# Patient Record
Sex: FEMALE | Race: ASIAN | NOT HISPANIC OR LATINO | Employment: UNEMPLOYED | URBAN - METROPOLITAN AREA
[De-identification: names, ages, dates, MRNs, and addresses within clinical notes are randomized per-mention and may not be internally consistent; named-entity substitution may affect disease eponyms.]

---

## 2023-04-11 ENCOUNTER — OFFICE VISIT (OUTPATIENT)
Dept: CARDIOLOGY | Facility: CLINIC | Age: 38
End: 2023-04-11
Payer: COMMERCIAL

## 2023-04-11 VITALS
HEIGHT: 70 IN | DIASTOLIC BLOOD PRESSURE: 89 MMHG | HEART RATE: 89 BPM | BODY MASS INDEX: 18.48 KG/M2 | WEIGHT: 129.1 LBS | SYSTOLIC BLOOD PRESSURE: 129 MMHG

## 2023-04-11 DIAGNOSIS — I47.10 SVT (SUPRAVENTRICULAR TACHYCARDIA) (H): Primary | ICD-10-CM

## 2023-04-11 PROCEDURE — 99215 OFFICE O/P EST HI 40 MIN: CPT | Performed by: INTERNAL MEDICINE

## 2023-04-11 RX ORDER — DILTIAZEM HYDROCHLORIDE 120 MG/1
1 CAPSULE, EXTENDED RELEASE ORAL DAILY
COMMUNITY
Start: 2023-02-22 | End: 2024-02-22

## 2023-04-11 RX ORDER — DILTIAZEM HYDROCHLORIDE 30 MG/1
30 TABLET, FILM COATED ORAL 4 TIMES DAILY PRN
Qty: 90 TABLET | Refills: 3 | Status: SHIPPED | OUTPATIENT
Start: 2023-04-11 | End: 2023-06-16

## 2023-04-11 NOTE — LETTER
4/11/2023    MD [facility] [place] [address]  [facility] [address]    RE: Lenora Morse       Dear Colleague,     I had the pleasure of seeing Lenora Morse in the [facility].    Electrophysiology Clinic Progress Note    Lenora Morse MRN# [id number]   YOB: 1985 Age: 38 year old     Primary cardiologist:          Assessment and Plan   Thank you for allowing me to participate in the care of this very delightful patient.  As you know, Lenora is a 38-year-old female previously healthy who had a viral illness a few years ago and soon thereafter noted palpitations of sudden onset.  She was subsequently evaluated by EP over at [facility].  EP study was subsequently done showing a focal atrial tachycardia near the CS os.  Ablation was done successfully.    Unfortunately, over the past several months has been having more episodes of sudden racing heartbeat with minimal activities for which she was able to capture on her Apple Watch at a rate 150 bpm which was slower than the original atrial tachycardia of 200 bpm.  She was on a beta-blocker which was change to diltiazem sustained-release 120 mg a day because of dizziness.  Patient was recommended to take beta-blocker on a as needed basis.    Patient decided to see us for second opinion.  I reassured Lenora that what she has been having could be the same focal atrial tachycardia or something else entirely but the main thing is overall good prognosis given the fact that she has normal cardiac structure and function.  For now I would prescribe short acting diltiazem 30 mg every 6 hours as needed.  I also asked her to consider taking her sustained diltiazem at night or first thing in the morning as her symptoms generally occur around midmorning.    Patient is also quite concerned about her fast heart rate with minimal exercise along with chest pain.  To reassure her that her symptom is not from coronary  "ischemia I would recommend a treadmill EKG and if that normal I asked her to continue to exercise and if the heart rate remained fast she can take an extra short acting diltiazem half an hour before.    I would consider repeat EP study and ablation should she have more episodes of documented SVT in the future.             Review of Systems     12-pt ROS is negative except for as noted in the HPI.          Physical Exam     Vitals: /89   Pulse 89   Ht 1.778 m (5' 10\")   Wt 58.6 kg (129 lb 1.6 oz)   BMI 18.52 kg/m    Wt Readings from Last 10 Encounters:   04/11/23 58.6 kg (129 lb 1.6 oz)       Constitutional:  Patient is pleasant, alert, cooperative, and in NAD.  HEENT:  NCAT. PERRLA. EOM's intact.   Neck:  CVP appears normal. No carotid bruits.   Pulmonary: Normal respiratory effort. CTAB.   Cardiac: RRR, normal S1/S2, no S3/S4, no murmur or rub.   Abdomen:  Non-tender abdomen, no hepatosplenomegaly appreciated.   Vascular: Pulses in the upper and lower extremities are 2+ and equal bilaterally.  Extremities: No edema, erythema, cyanosis or tenderness appreciated.  Skin:  No rashes or lesions appreciated.   Neurological:  No gross motor or sensory deficits.   Psych: Appropriate affect.          Data   Labs reviewed:  No lab results found.    Invalid input(s): CMP, CBC    No results found for: WBC, RBC, HGB, HCT, MCV, MCH, MCHC, RDW, PLT    No results found for: NA, POTASSIUM, CHLORIDE, CO2, ANIONGAP, GLC, BUN, CR, GFRESTIMATED, GFRESTBLACK, MAHENDRA   No results found for: AST, ALT    No results found for: A1C    No results found for: INR         Problem List   There is no problem list on file for this patient.           Medications     Current Outpatient Medications   Medication Sig Dispense Refill    diltiazem (CARDIZEM) 30 MG tablet Take 1 tablet (30 mg) by mouth 4 times daily as needed (palpitations) 90 tablet 3    diltiazem ER (DILT-XR) 120 MG 24 hr capsule Take 1 capsule by mouth daily      Multiple " Vitamins-Minerals (HAIR SKIN AND NAILS FORMULA) TABS               Past Medical History   History reviewed. No pertinent past medical history.  History reviewed. No pertinent surgical history.  History reviewed. No pertinent family history.  Social History     Socioeconomic History    Marital status:      Spouse name: Not on file    Number of children: Not on file    Years of education: Not on file    Highest education level: Not on file   Occupational History    Not on file   Tobacco Use    Smoking status: Never    Smokeless tobacco: Never   Vaping Use    Vaping status: Not on file   Substance and Sexual Activity    Alcohol use: Yes     Comment: Rarely    Drug use: Not on file    Sexual activity: Not on file   Other Topics Concern    Not on file   Social History Narrative    Not on file     Social Determinants of Health     Financial Resource Strain: Not on file   Food Insecurity: Not on file   Transportation Needs: Not on file   Physical Activity: Not on file   Stress: Not on file   Social Connections: Not on file   Intimate Partner Violence: Not on file   Housing Stability: Not on file            Allergies   Patient has no known allergies.    Today's clinic visit entailed:  The following tests were independently interpreted by me as noted in my documentation: ecg, apple watch  45 minutes spent by me on the date of the encounter doing chart review   Provider  Link to [facility] Help Grid     The level of medical decision making during this visit was of moderate complexity.    Thank you for allowing me to participate in the care of your patient.      Sincerely,     Prasanth Dunbar MD     [facility]  cc:   No referring provider defined for this encounter.

## 2023-04-11 NOTE — PROGRESS NOTES
Electrophysiology Clinic Progress Note    Lenora Morse MRN# [id number]   YOB: 1985 Age: 38 year old     Primary cardiologist:          Assessment and Plan   Thank you for allowing me to participate in the care of this very delightful patient.  As you know, Lenora is a 38-year-old female previously healthy who had a viral illness a few years ago and soon thereafter noted palpitations of sudden onset.  She was subsequently evaluated by EP over at [facility].  EP study was subsequently done showing a focal atrial tachycardia near the CS os.  Ablation was done successfully.    Unfortunately, over the past several months has been having more episodes of sudden racing heartbeat with minimal activities for which she was able to capture on her Apple Watch at a rate 150 bpm which was slower than the original atrial tachycardia of 200 bpm.  She was on a beta-blocker which was change to diltiazem sustained-release 120 mg a day because of dizziness.  Patient was recommended to take beta-blocker on a as needed basis.    Patient decided to see us for second opinion.  I reassured Lenora that what she has been having could be the same focal atrial tachycardia or something else entirely but the main thing is overall good prognosis given the fact that she has normal cardiac structure and function.  For now I would prescribe short acting diltiazem 30 mg every 6 hours as needed.  I also asked her to consider taking her sustained diltiazem at night or first thing in the morning as her symptoms generally occur around midmorning.    Patient is also quite concerned about her fast heart rate with minimal exercise along with chest pain.  To reassure her that her symptom is not from coronary ischemia I would recommend a treadmill EKG and if that normal I asked her to continue to exercise and if the heart rate remained fast she can take an extra short acting diltiazem half an hour before.    I would consider repeat EP study  "and ablation should she have more episodes of documented SVT in the future.             Review of Systems     12-pt ROS is negative except for as noted in the HPI.          Physical Exam     Vitals: /89   Pulse 89   Ht 1.778 m (5' 10\")   Wt 58.6 kg (129 lb 1.6 oz)   BMI 18.52 kg/m    Wt Readings from Last 10 Encounters:   04/11/23 58.6 kg (129 lb 1.6 oz)       Constitutional:  Patient is pleasant, alert, cooperative, and in NAD.  HEENT:  NCAT. PERRLA. EOM's intact.   Neck:  CVP appears normal. No carotid bruits.   Pulmonary: Normal respiratory effort. CTAB.   Cardiac: RRR, normal S1/S2, no S3/S4, no murmur or rub.   Abdomen:  Non-tender abdomen, no hepatosplenomegaly appreciated.   Vascular: Pulses in the upper and lower extremities are 2+ and equal bilaterally.  Extremities: No edema, erythema, cyanosis or tenderness appreciated.  Skin:  No rashes or lesions appreciated.   Neurological:  No gross motor or sensory deficits.   Psych: Appropriate affect.          Data   Labs reviewed:  No lab results found.    Invalid input(s): CMP, CBC    No results found for: WBC, RBC, HGB, HCT, MCV, MCH, MCHC, RDW, PLT    No results found for: NA, POTASSIUM, CHLORIDE, CO2, ANIONGAP, GLC, BUN, CR, GFRESTIMATED, GFRESTBLACK, MAHENDRA   No results found for: AST, ALT    No results found for: A1C    No results found for: INR         Problem List   There is no problem list on file for this patient.           Medications     Current Outpatient Medications   Medication Sig Dispense Refill     diltiazem (CARDIZEM) 30 MG tablet Take 1 tablet (30 mg) by mouth 4 times daily as needed (palpitations) 90 tablet 3     diltiazem ER (DILT-XR) 120 MG 24 hr capsule Take 1 capsule by mouth daily       Multiple Vitamins-Minerals (HAIR SKIN AND NAILS FORMULA) TABS               Past Medical History   History reviewed. No pertinent past medical history.  History reviewed. No pertinent surgical history.  History reviewed. No pertinent family " history.  Social History     Socioeconomic History     Marital status:      Spouse name: Not on file     Number of children: Not on file     Years of education: Not on file     Highest education level: Not on file   Occupational History     Not on file   Tobacco Use     Smoking status: Never     Smokeless tobacco: Never   Vaping Use     Vaping status: Not on file   Substance and Sexual Activity     Alcohol use: Yes     Comment: Rarely     Drug use: Not on file     Sexual activity: Not on file   Other Topics Concern     Not on file   Social History Narrative     Not on file     Social Determinants of Health     Financial Resource Strain: Not on file   Food Insecurity: Not on file   Transportation Needs: Not on file   Physical Activity: Not on file   Stress: Not on file   Social Connections: Not on file   Intimate Partner Violence: Not on file   Housing Stability: Not on file            Allergies   Patient has no known allergies.    Today's clinic visit entailed:  The following tests were independently interpreted by me as noted in my documentation: ecg, apple watch  45 minutes spent by me on the date of the encounter doing chart review   Provider  Link to [facility] Help Grid     The level of medical decision making during this visit was of moderate complexity.

## 2023-05-03 ENCOUNTER — HOSPITAL ENCOUNTER (OUTPATIENT)
Dept: CARDIOLOGY | Facility: CLINIC | Age: 38
Discharge: HOME | End: 2023-05-03
Attending: INTERNAL MEDICINE | Admitting: INTERNAL MEDICINE
Payer: COMMERCIAL

## 2023-05-03 DIAGNOSIS — I47.10 SVT (SUPRAVENTRICULAR TACHYCARDIA) (H): ICD-10-CM

## 2023-05-03 PROCEDURE — 93018 CV STRESS TEST I&R ONLY: CPT | Performed by: INTERNAL MEDICINE

## 2023-05-03 PROCEDURE — 93016 CV STRESS TEST SUPVJ ONLY: CPT | Performed by: INTERNAL MEDICINE

## 2023-05-03 PROCEDURE — 93017 CV STRESS TEST TRACING ONLY: CPT

## 2023-05-21 ENCOUNTER — HEALTH MAINTENANCE LETTER (OUTPATIENT)
Age: 38
End: 2023-05-21

## 2023-06-16 DIAGNOSIS — I47.10 SVT (SUPRAVENTRICULAR TACHYCARDIA) (H): ICD-10-CM

## 2023-06-16 RX ORDER — DILTIAZEM HYDROCHLORIDE 30 MG/1
30 TABLET, FILM COATED ORAL 4 TIMES DAILY PRN
Qty: 360 TABLET | Refills: 1 | Status: SHIPPED | OUTPATIENT
Start: 2023-06-16

## 2024-03-10 ENCOUNTER — HEALTH MAINTENANCE LETTER (OUTPATIENT)
Age: 39
End: 2024-03-10

## 2024-07-28 ENCOUNTER — HEALTH MAINTENANCE LETTER (OUTPATIENT)
Age: 39
End: 2024-07-28

## 2025-08-10 ENCOUNTER — HEALTH MAINTENANCE LETTER (OUTPATIENT)
Age: 40
End: 2025-08-10